# Patient Record
Sex: MALE | Race: WHITE | NOT HISPANIC OR LATINO | Employment: PART TIME | URBAN - METROPOLITAN AREA
[De-identification: names, ages, dates, MRNs, and addresses within clinical notes are randomized per-mention and may not be internally consistent; named-entity substitution may affect disease eponyms.]

---

## 2021-04-16 ENCOUNTER — OFFICE VISIT (OUTPATIENT)
Dept: FAMILY MEDICINE CLINIC | Facility: CLINIC | Age: 21
End: 2021-04-16
Payer: COMMERCIAL

## 2021-04-16 VITALS
SYSTOLIC BLOOD PRESSURE: 120 MMHG | DIASTOLIC BLOOD PRESSURE: 80 MMHG | RESPIRATION RATE: 17 BRPM | OXYGEN SATURATION: 98 % | BODY MASS INDEX: 30.49 KG/M2 | TEMPERATURE: 97.8 F | WEIGHT: 183 LBS | HEIGHT: 65 IN | HEART RATE: 58 BPM

## 2021-04-16 DIAGNOSIS — Z82.41 FAMILY HISTORY OF SUDDEN CARDIAC DEATH IN FATHER: ICD-10-CM

## 2021-04-16 DIAGNOSIS — R07.9 CHEST PAIN, UNSPECIFIED TYPE: ICD-10-CM

## 2021-04-16 DIAGNOSIS — Z13.1 DIABETES MELLITUS SCREENING: ICD-10-CM

## 2021-04-16 DIAGNOSIS — Z13.29 THYROID DISORDER SCREEN: ICD-10-CM

## 2021-04-16 DIAGNOSIS — Z13.220 LIPID SCREENING: ICD-10-CM

## 2021-04-16 DIAGNOSIS — Z02.4 DRIVER'S PERMIT PE (PHYSICAL EXAMINATION): Primary | ICD-10-CM

## 2021-04-16 DIAGNOSIS — Z82.49 FAMILY HISTORY OF HEART DISEASE IN MALE FAMILY MEMBER BEFORE AGE 55: ICD-10-CM

## 2021-04-16 DIAGNOSIS — Z84.89 FAMILY HISTORY OF SUDDEN DEATH IN MOTHER: ICD-10-CM

## 2021-04-16 DIAGNOSIS — Z76.89 ENCOUNTER TO ESTABLISH CARE WITH NEW DOCTOR: ICD-10-CM

## 2021-04-16 PROCEDURE — 3725F SCREEN DEPRESSION PERFORMED: CPT | Performed by: FAMILY MEDICINE

## 2021-04-16 PROCEDURE — 99203 OFFICE O/P NEW LOW 30 MIN: CPT | Performed by: FAMILY MEDICINE

## 2021-04-16 PROCEDURE — 3008F BODY MASS INDEX DOCD: CPT | Performed by: FAMILY MEDICINE

## 2021-04-16 PROCEDURE — 1036F TOBACCO NON-USER: CPT | Performed by: FAMILY MEDICINE

## 2021-04-16 NOTE — PROGRESS NOTES
Assessment/Plan:         Diagnoses and all orders for this visit:    's permit PE (physical examination)    Encounter to establish care with new doctor    Family history of sudden cardiac death in father  -     ECG 12 lead; Future  -     Echo complete with contrast if indicated; Future  -     Ambulatory referral to Cardiology; Future  -     CBC and differential; Future  -     CK (with reflex to MB); Future  -     Comprehensive metabolic panel; Future  -     Lipid panel; Future    Family history of sudden death in mother  -     ECG 12 lead; Future  -     Echo complete with contrast if indicated; Future  -     Ambulatory referral to Cardiology; Future  -     CBC and differential; Future  -     CK (with reflex to MB); Future  -     Comprehensive metabolic panel; Future  -     Lipid panel; Future    Family history of heart disease in male family member before age 54  -     ECG 15 lead; Future  -     Echo complete with contrast if indicated; Future  -     Ambulatory referral to Cardiology; Future  -     CBC and differential; Future  -     CK (with reflex to MB); Future  -     Comprehensive metabolic panel; Future  -     Lipid panel; Future    BMI 30 0-30 9,adult  -     Echo complete with contrast if indicated; Future  -     Ambulatory referral to Cardiology; Future  -     TSH, 3rd generation with Free T4 reflex; Future    Chest pain, unspecified type  -     ECG 12 lead; Future  -     Echo complete with contrast if indicated; Future  -     Ambulatory referral to Cardiology; Future  -     CBC and differential; Future  -     CK (with reflex to MB); Future  -     C-reactive protein; Future  -     Comprehensive metabolic panel; Future  -     Lipid panel; Future  -     TSH, 3rd generation with Free T4 reflex; Future  -     DES Screen w/ Reflex to Titer/Pattern; Future    Thyroid disorder screen  -     TSH, 3rd generation with Free T4 reflex; Future    Lipid screening  -     Lipid panel;  Future    Diabetes mellitus screening  -     Comprehensive metabolic panel; Future    Other orders  -     Cancel: Comprehensive metabolic panel; Future  -     Cancel: TSH, 3rd generation with Free T4 reflex; Future  -     Cancel: Lipid panel; Future          Subjective:   Chief Complaint   Patient presents with   [facility]      Has form for learners permit to be completed         Patient ID: Aurea Heredia is a 22 y o  male  New pt  No old records; states his last visit to doctor was his pediatrician in [place]  Strong fam hist heart disease and sudden death in his parents-  Does have chest pains "going on for awhile, did just lose a lot of weight, so it's stopped down a lot"  Denies any SOB sx      The following portions of the patient's history were reviewed and updated as appropriate: allergies, current medications, past family history, past medical history, past social history, past surgical history and problem list     Review of Systems   Neurological:        Right pinkie toe gets numb on and off for past year- no injury   All other systems reviewed and are negative  Objective:      /80 (BP Location: Right arm, Patient Position: Sitting)   Pulse 58   Temp 97 8 °F (36 6 °C)   Resp 17   Ht 5' 5" (1 651 m)   Wt 83 kg (183 lb)   SpO2 98%   BMI 30 45 kg/m²          Physical Exam  Vitals signs and nursing note reviewed  Constitutional:       General: He is not in acute distress  Appearance: He is well-developed  He is not ill-appearing, toxic-appearing or diaphoretic  HENT:      Head: Normocephalic and atraumatic  Eyes:      General: Lids are normal       Conjunctiva/sclera: Conjunctivae normal       Pupils: Pupils are equal, round, and reactive to light  Neck:      Musculoskeletal: Neck supple  Thyroid: No thyroid mass or thyromegaly  Vascular: No JVD  Trachea: Trachea normal    Cardiovascular:      Rate and Rhythm: Normal rate and regular rhythm  Pulses: Normal pulses  Heart sounds: Normal heart sounds  Pulmonary:      Effort: Pulmonary effort is normal       Breath sounds: Normal breath sounds  Abdominal:      General: Bowel sounds are normal  There is no distension or abdominal bruit  Palpations: Abdomen is soft  There is no hepatomegaly, splenomegaly or mass  Tenderness: There is no abdominal tenderness  Hernia: There is no hernia in the ventral area  Musculoskeletal:      Right shoulder: Normal       Left shoulder: Normal       Cervical back: Normal       Thoracic back: Normal       Lumbar back: Normal       Right hand: Normal       Left hand: Normal       Right lower leg: No edema  Left lower leg: No edema  Lymphadenopathy:      Cervical: No cervical adenopathy  Upper Body:      Right upper body: No supraclavicular adenopathy  Left upper body: No supraclavicular adenopathy  Skin:     General: Skin is warm and dry  Coloration: Skin is not pale  Neurological:      Mental Status: He is alert and oriented to person, place, and time  Gait: Gait normal    Psychiatric:         Mood and Affect: Mood normal          Behavior: Behavior normal  Behavior is cooperative  BMI Counseling: Body mass index is 30 45 kg/m²  The BMI is above normal  Nutrition recommendations include reducing portion sizes  Exercise recommendations include exercising 3-5 times per week

## 2024-12-23 ENCOUNTER — OFFICE VISIT (OUTPATIENT)
Dept: FAMILY MEDICINE CLINIC | Facility: CLINIC | Age: 24
End: 2024-12-23
Payer: COMMERCIAL

## 2024-12-23 ENCOUNTER — APPOINTMENT (OUTPATIENT)
Dept: LAB | Facility: CLINIC | Age: 24
End: 2024-12-23
Payer: COMMERCIAL

## 2024-12-23 VITALS
OXYGEN SATURATION: 99 % | SYSTOLIC BLOOD PRESSURE: 130 MMHG | WEIGHT: 161.4 LBS | BODY MASS INDEX: 26.89 KG/M2 | TEMPERATURE: 94.5 F | DIASTOLIC BLOOD PRESSURE: 78 MMHG | HEART RATE: 69 BPM | HEIGHT: 65 IN

## 2024-12-23 DIAGNOSIS — Z00.00 ANNUAL PHYSICAL EXAM: Primary | ICD-10-CM

## 2024-12-23 DIAGNOSIS — Z13.220 SCREENING FOR HYPERLIPIDEMIA: ICD-10-CM

## 2024-12-23 DIAGNOSIS — Z13.1 SCREENING FOR DIABETES MELLITUS: ICD-10-CM

## 2024-12-23 DIAGNOSIS — Z76.89 ENCOUNTER TO ESTABLISH CARE WITH NEW DOCTOR: ICD-10-CM

## 2024-12-23 LAB
ALBUMIN SERPL BCG-MCNC: 4.9 G/DL (ref 3.5–5)
ALP SERPL-CCNC: 62 U/L (ref 34–104)
ALT SERPL W P-5'-P-CCNC: 24 U/L (ref 7–52)
ANION GAP SERPL CALCULATED.3IONS-SCNC: 6 MMOL/L (ref 4–13)
AST SERPL W P-5'-P-CCNC: 16 U/L (ref 13–39)
BILIRUB SERPL-MCNC: 0.53 MG/DL (ref 0.2–1)
BUN SERPL-MCNC: 18 MG/DL (ref 5–25)
CALCIUM SERPL-MCNC: 9.5 MG/DL (ref 8.4–10.2)
CHLORIDE SERPL-SCNC: 103 MMOL/L (ref 96–108)
CHOLEST SERPL-MCNC: 169 MG/DL (ref ?–200)
CO2 SERPL-SCNC: 31 MMOL/L (ref 21–32)
CREAT SERPL-MCNC: 0.72 MG/DL (ref 0.6–1.3)
GFR SERPL CREATININE-BSD FRML MDRD: 131 ML/MIN/1.73SQ M
GLUCOSE P FAST SERPL-MCNC: 105 MG/DL (ref 65–99)
HDLC SERPL-MCNC: 56 MG/DL
LDLC SERPL CALC-MCNC: 94 MG/DL (ref 0–100)
NONHDLC SERPL-MCNC: 113 MG/DL
POTASSIUM SERPL-SCNC: 4.3 MMOL/L (ref 3.5–5.3)
PROT SERPL-MCNC: 6.9 G/DL (ref 6.4–8.4)
SODIUM SERPL-SCNC: 140 MMOL/L (ref 135–147)
TRIGL SERPL-MCNC: 97 MG/DL (ref ?–150)

## 2024-12-23 PROCEDURE — 80061 LIPID PANEL: CPT

## 2024-12-23 PROCEDURE — 99385 PREV VISIT NEW AGE 18-39: CPT | Performed by: FAMILY MEDICINE

## 2024-12-23 PROCEDURE — 80053 COMPREHEN METABOLIC PANEL: CPT

## 2024-12-23 PROCEDURE — 36415 COLL VENOUS BLD VENIPUNCTURE: CPT

## 2024-12-23 NOTE — PROGRESS NOTES
Adult Annual Physical  Name: Mike Nayak      : 2000      MRN: [id number]  Encounter Provider: Wisam Canada MD  Encounter Date: 2024   Encounter department: [facility]    Assessment & Plan  Screening for diabetes mellitus    Orders:    Comprehensive metabolic panel; Future    Screening for hyperlipidemia    Orders:    Lipid panel; Future    Encounter to establish care with new doctor         Annual physical exam         Immunizations and preventive care screenings were discussed with patient today. Appropriate education was printed on patient's after visit summary.    Counseling:  Alcohol/drug use: discussed moderation in alcohol intake, the recommendations for healthy alcohol use, and avoidance of illicit drug use.  Dental Health: discussed importance of regular tooth brushing, flossing, and dental visits.  Injury prevention: discussed safety/seat belts, safety helmets, smoke detectors, carbon monoxide detectors, and smoking near bedding or upholstery.  Sexual health: discussed sexually transmitted diseases, partner selection, use of condoms, avoidance of unintended pregnancy, and contraceptive alternatives.  Exercise: the importance of regular exercise/physical activity was discussed. Recommend exercise 3-5 times per week for at least 30 minutes.       Depression Screening and Follow-up Plan: Patient was screened for depression during today's encounter. They screened negative with a PHQ-2 score of 0.        History of Present Illness     Adult Annual Physical:  Patient presents for annual physical. Patient presents office today to establish care.  Has not had PCP in a few years.  Feels well no acute concerns or complaints today.  He is just coming in to establish in case anything is ever needed in the future.  He is on any medications.  Denies any chronic medical history..     Diet and Physical Activity:  - Diet/Nutrition: well balanced diet.  - Exercise: moderate  "cardiovascular exercise.    Depression Screening:  - PHQ-2 Score: 0    General Health:  - Sleep: sleeps well.  - Hearing: normal hearing right ear.  - Vision: no vision problems.  - Dental: brushes teeth once daily.     Health:  - History of STDs: no.   - Urinary symptoms: none.     Review of Systems   All other systems reviewed and are negative.        Objective   /78 (BP Location: Left arm, Patient Position: Sitting, Cuff Size: Standard)   Pulse 69   Temp (!) 94.5 °F (34.7 °C) (Tympanic)   Ht 5' 5\" (1.651 m)   Wt 73.2 kg (161 lb 6.4 oz)   SpO2 99%   BMI 26.86 kg/m²     Physical Exam  Vitals and nursing note reviewed.   Constitutional:       General: He is not in acute distress.     Appearance: Normal appearance. He is well-developed. He is not ill-appearing, toxic-appearing or diaphoretic.   HENT:      Head: Normocephalic and atraumatic.      Nose: Nose normal.      Mouth/Throat:      Mouth: Mucous membranes are moist.      Pharynx: Oropharynx is clear. No oropharyngeal exudate or posterior oropharyngeal erythema.   Eyes:      General: No scleral icterus.        Right eye: No discharge.         Left eye: No discharge.      Extraocular Movements: Extraocular movements intact.      Conjunctiva/sclera: Conjunctivae normal.      Pupils: Pupils are equal, round, and reactive to light.   Cardiovascular:      Rate and Rhythm: Normal rate and regular rhythm.      Pulses: Normal pulses.      Heart sounds: Normal heart sounds. No murmur heard.  Pulmonary:      Effort: Pulmonary effort is normal. No respiratory distress.      Breath sounds: Normal breath sounds.   Abdominal:      General: There is no distension.      Palpations: Abdomen is soft. There is no mass.      Tenderness: There is no abdominal tenderness. There is no guarding or rebound.      Hernia: No hernia is present.   Musculoskeletal:         General: Normal range of motion.      Cervical back: Normal range of motion and neck supple. No rigidity " or tenderness.      Right lower leg: No edema.      Left lower leg: No edema.   Lymphadenopathy:      Cervical: No cervical adenopathy.   Skin:     General: Skin is warm and dry.   Neurological:      General: No focal deficit present.      Mental Status: He is alert and oriented to person, place, and time.      Cranial Nerves: No cranial nerve deficit.      Sensory: No sensory deficit.      Motor: No weakness.      Coordination: Coordination normal.      Gait: Gait normal.      Deep Tendon Reflexes: Reflexes normal.   Psychiatric:         Mood and Affect: Mood normal.         Behavior: Behavior normal.         Thought Content: Thought content normal.         Judgment: Judgment normal.

## 2024-12-24 ENCOUNTER — RESULTS FOLLOW-UP (OUTPATIENT)
Dept: FAMILY MEDICINE CLINIC | Facility: CLINIC | Age: 24
End: 2024-12-24

## 2024-12-24 ENCOUNTER — TELEPHONE (OUTPATIENT)
Dept: FAMILY MEDICINE CLINIC | Facility: CLINIC | Age: 24
End: 2024-12-24

## 2024-12-24 NOTE — TELEPHONE ENCOUNTER
(Newest Message First)  December 24, 2024  Me   AS    12/24/24  9:49 AM  Result Note  Called and spoke with patient advising him his results and recommendations. Patient aware and understands  Wisam Canada MD to [facility]        12/24/24  9:14 AM  Result Note  Please call patient let him know his labs look okay.  His fasting sugar was up a bit but this is not too concerning at this time.  Will recheck labs next year.  I would recommend to watch his processed carbohydrate and sugar intake.